# Patient Record
Sex: FEMALE | URBAN - METROPOLITAN AREA
[De-identification: names, ages, dates, MRNs, and addresses within clinical notes are randomized per-mention and may not be internally consistent; named-entity substitution may affect disease eponyms.]

---

## 2023-06-26 ENCOUNTER — APPOINTMENT (OUTPATIENT)
Dept: LAB | Facility: LAB | Age: 18
End: 2023-06-26
Payer: COMMERCIAL

## 2023-06-29 LAB
NIL(NEG) CONTROL SPOT COUNT: NORMAL
PANEL A SPOT COUNT: 0
PANEL B SPOT COUNT: 0
POS CONTROL SPOT COUNT: NORMAL
T-SPOT. TB INTERPRETATION: NEGATIVE

## 2023-12-11 PROCEDURE — RXMED WILLOW AMBULATORY MEDICATION CHARGE

## 2023-12-13 ENCOUNTER — PHARMACY VISIT (OUTPATIENT)
Dept: PHARMACY | Facility: CLINIC | Age: 18
End: 2023-12-13
Payer: COMMERCIAL

## 2024-02-14 PROCEDURE — RXMED WILLOW AMBULATORY MEDICATION CHARGE

## 2024-02-15 ENCOUNTER — PHARMACY VISIT (OUTPATIENT)
Dept: PHARMACY | Facility: CLINIC | Age: 19
End: 2024-02-15
Payer: MEDICARE

## 2024-03-23 PROCEDURE — RXMED WILLOW AMBULATORY MEDICATION CHARGE

## 2024-03-25 ENCOUNTER — PHARMACY VISIT (OUTPATIENT)
Dept: PHARMACY | Facility: CLINIC | Age: 19
End: 2024-03-25
Payer: COMMERCIAL

## 2024-04-26 PROCEDURE — RXMED WILLOW AMBULATORY MEDICATION CHARGE

## 2024-04-29 ENCOUNTER — PHARMACY VISIT (OUTPATIENT)
Dept: PHARMACY | Facility: CLINIC | Age: 19
End: 2024-04-29
Payer: COMMERCIAL

## 2024-05-06 PROCEDURE — RXMED WILLOW AMBULATORY MEDICATION CHARGE

## 2024-05-08 ENCOUNTER — PHARMACY VISIT (OUTPATIENT)
Dept: PHARMACY | Facility: CLINIC | Age: 19
End: 2024-05-08
Payer: COMMERCIAL

## 2024-09-10 PROCEDURE — RXMED WILLOW AMBULATORY MEDICATION CHARGE

## 2024-09-13 ENCOUNTER — PHARMACY VISIT (OUTPATIENT)
Dept: PHARMACY | Facility: CLINIC | Age: 19
End: 2024-09-13
Payer: COMMERCIAL

## 2025-04-21 ENCOUNTER — PHARMACY VISIT (OUTPATIENT)
Dept: PHARMACY | Facility: CLINIC | Age: 20
End: 2025-04-21
Payer: COMMERCIAL

## 2025-04-21 ENCOUNTER — HOSPITAL ENCOUNTER (OUTPATIENT)
Dept: RADIOLOGY | Facility: HOSPITAL | Age: 20
Discharge: HOME | End: 2025-04-21
Payer: COMMERCIAL

## 2025-04-21 DIAGNOSIS — G89.29 CHRONIC PAIN OF LEFT WRIST: ICD-10-CM

## 2025-04-21 DIAGNOSIS — G56.02 CARPAL TUNNEL SYNDROME OF LEFT WRIST: ICD-10-CM

## 2025-04-21 DIAGNOSIS — M25.532 CHRONIC PAIN OF LEFT WRIST: Primary | ICD-10-CM

## 2025-04-21 DIAGNOSIS — M25.532 CHRONIC PAIN OF LEFT WRIST: ICD-10-CM

## 2025-04-21 DIAGNOSIS — G89.29 CHRONIC PAIN OF LEFT WRIST: Primary | ICD-10-CM

## 2025-04-21 PROCEDURE — 73110 X-RAY EXAM OF WRIST: CPT | Mod: LT

## 2025-04-21 PROCEDURE — RXMED WILLOW AMBULATORY MEDICATION CHARGE

## 2025-04-21 PROCEDURE — 73110 X-RAY EXAM OF WRIST: CPT | Mod: LEFT SIDE | Performed by: RADIOLOGY

## 2025-04-21 RX ORDER — METHYLPREDNISOLONE 4 MG/1
TABLET ORAL
Qty: 21 TABLET | Refills: 0 | OUTPATIENT
Start: 2025-04-21

## 2025-05-01 ENCOUNTER — EVALUATION (OUTPATIENT)
Dept: OCCUPATIONAL THERAPY | Facility: HOSPITAL | Age: 20
End: 2025-05-01
Payer: COMMERCIAL

## 2025-05-01 DIAGNOSIS — G56.02 CARPAL TUNNEL SYNDROME, LEFT UPPER LIMB: Primary | ICD-10-CM

## 2025-05-01 PROCEDURE — 97165 OT EVAL LOW COMPLEX 30 MIN: CPT | Mod: GO

## 2025-05-01 PROCEDURE — 97110 THERAPEUTIC EXERCISES: CPT | Mod: GO

## 2025-05-01 ASSESSMENT — ENCOUNTER SYMPTOMS
DEPRESSION: 0
LOSS OF SENSATION IN FEET: 0
OCCASIONAL FEELINGS OF UNSTEADINESS: 0

## 2025-05-01 NOTE — PROGRESS NOTES
Occupational Therapy Evaluation    Patient Name: Cynthia Walker  MRN: [id number]  Today's Date: 5/1/2025  Time Calculation  Start Time: 0120  Stop Time: 0220  Time Calculation (min): 60 min  Problem List Items Addressed This Visit    None  Visit Diagnoses         Codes      Carpal tunnel syndrome, left upper limb    -  Primary G56.02    Relevant Orders    Follow Up In Occupational Therapy             Insurance  Visit 1 of 60  Authorization: not required  Insurance plan: Payor: [employer] / Plan: [employer]  HEALTHCARE / Product Type: *No Product type* /     Assessment: Cynthia Walker is a 21 yo [employer] senior (who is about to graduate!) with a 3 month history of left carpal tunnel syndrome. She reports intermittent moderate pain and mild tingling in the median nerve distribution that are both lessened by full time prefab wrist/hand orthosis use. Functionally, her symptoms make some activities of daily living difficult, including writing, drawing, and crafting. She demonstrates full left upper extremity motion and good strength. In addition to median nerve compression at the wrist, she reported mild tingling in her left index and middle finger tips after 30 seconds of cervical lateral flexion to the right. I got her started on a home program including brachial plexus glides, cervical stretches, active scapular motion, extrinsic wrist/finger flexor/extensor stretches, and finger tendon glides. She will benefit from ongoing occupational therapy in order to get back to safe I/ADL performance and her prior level of function.     Plan: assess lacertus fibrosis, ultrasound; therapeutic exercise, therapeutic activity, manual therapy, neuromuscular coordination, kinesiotaping  Goals  In 8-10 weeks, Cynthia will achieve the following goals:  She will be able to perform self-care, household, school, work, and leisure tasks with no or low pain (1-3/10), 75% of the time.  She will report a 50-75% decrease in incidence of paresthesia in  her L hand, allowing her to use it more functionally.  She will report that she needs her prefab WHO only sporadically (0-2 nights/week).  She will decrease her QuickDASH score by 10-15 points (20 at eval).  Patient's goals for therapy: not to have to need the WHO, resolve symptoms    Plan of care was developed with input and agreement by the patient  Frequency: Every Other Week  Duration: 8 Weeks    Precautions:  Movement Restrictions: No:  Weight Bearing Status: As tolerated    Subjective   Onset in Feb. Had been crafting a lot then. Wore brace 24/7, then weaned during the day, was still wearing it at night. Got bad again recently and is wearing it 24/7 again. Helps. She does a lot of fine motor tasks, sewing, drawing, making friendship bracelets, etc. Jolts of pain starting in CTS, sometimes traveling to fingers, they pass quickly, but sometimes it's like a sharp spasm. Has experienced muscle spasms before but not this past week. Has to take notes on ipad with pencil, which is easier than paper.    Prior level of function   Full functional use of her LUE    Patient Intake and Medical History form reviewed    Pain  At worst 7.5-8/10, can be none; hasn't woken up due to pain even with the pain    Objective   Outcome Measure: QuickDASH: 20    Edema: none now, L IF, MF have been swollen and red    Sensation  None now    Neuro exam:  Radial nerve: 5/5 strength in thumb extension, sensation intact to dorsal surface of thumb/index web space  Median nerve: 5/5 strength in thumb abduction and opposition, sensation intact to palmar surface of index finger  Ulnar nerve: 5/5 strength in thumb/little finger approximation, sensation intact to palmar surface of little finger     AROM BUE WNL      Strength  SHOULDER (MMT)                             OUT OF 5:   R L   Flexion 4+ 4+   Extension     Abduction 4+ 4+   IR at 0 degrees abd 4 4   ER at 0 degrees abd 4 4      ELBOW (MMT)   R L   Extension 4- 4-   Flexion 5 5      WRIST  (MMT)   R L   Extension 5 5   Flexion 4 4   Pronation 4 4   Supination 4 4      HAND (lbs)   R L (dominant)   Dynamometer  50 45   Lateral Pinch switch 15.5 15.5   3jaw Pinch 15 15        Special Tests LUE  Cervical screen: (each position held 30 seconds) cervical rotation to the left: NEG, cervical rotation to the right: NEG, lateral cervical flexion to left: NEG, lateral cervical flex to right: POS (mild tingling in L IF, MF tips)2, cervical extension: NEG, cervical flexion: NEG    AIN Weakness:  NEG    Pressure on lacertus fibrosis causes symptoms:    Tinel's Sign at Wrist:       Treatment  Education: home exercise program, plan of care, activity modification, pain management, and pertinent anatomy     Orthosis: She will continue to wear her prefab WHO full time until she graduates 5/18/25. During the following 2 weeks at home in [place], she will wean from it.  Therapeutic Exercise: Instructed her in her home program: brachial plexus glides, cervical stretches, active scapular motion, extrinsic wrist/finger flexor/extensor stretches, and finger tendon glides, all x 2 sets of 10 2x/day.        OT Evaluation Time Entry  OT Evaluation (Low) Time Entry: 40  OT Therapeutic Procedures Time Entry  Manual Therapy Time Entry: 10  Therapeutic Exercise Time Entry: 10

## 2025-05-13 ENCOUNTER — TREATMENT (OUTPATIENT)
Dept: OCCUPATIONAL THERAPY | Facility: HOSPITAL | Age: 20
End: 2025-05-13
Payer: COMMERCIAL

## 2025-05-13 DIAGNOSIS — G56.02 CARPAL TUNNEL SYNDROME, LEFT UPPER LIMB: ICD-10-CM

## 2025-05-13 PROCEDURE — 97140 MANUAL THERAPY 1/> REGIONS: CPT | Mod: GO

## 2025-05-13 PROCEDURE — 97110 THERAPEUTIC EXERCISES: CPT | Mod: GO

## 2025-05-13 NOTE — PROGRESS NOTES
Occupational Therapy Treatment    Patient Name: Cynthia Walker  MRN: [id number]  Today's Date: 5/13/2025  Time Calculation  Start Time: 0915  Stop Time: 0950  Time Calculation (min): 35 min  Problem List Items Addressed This Visit    None  Visit Diagnoses         Codes      Carpal tunnel syndrome, left upper limb     G56.02             Insurance  Visit 2  of 60  Authorization: not required  Insurance plan: Payor: [employer] / Plan: [employer]  HEALTHCARE / Product Type: *No Product type* /     Assessment: Cynthia's symptoms have diminished. She will continue with her home program and contact me if her symptoms do not totally resolve.     Plan: follow up prn  Goals  In 8-10 weeks, Cynthia will achieve the following goals:  She will be able to perform self-care, household, school, work, and leisure tasks with no or low pain (1-3/10), 75% of the time.  She will report a 50-75% decrease in incidence of paresthesia in her L hand, allowing her to use it more functionally.  She will report that she needs her prefab WHO only sporadically (0-2 nights/week).  She will decrease her QuickDASH score by 10-15 points (20 at eval).  Patient's goals for therapy: not to have to need the WHO, resolve symptoms    Plan of care was developed with input and agreement by the patient  Frequency: Every Other Week  Duration: 8 Weeks    Precautions:  Movement Restrictions: No:  Weight Bearing Status: As tolerated    Subjective   Done with school/Case, graduation this weekend; symptoms have improved. A little tingling in her hand since first visit, not a lot. Doing HEP 1-2x/day but has not been doing the scapular or cervical AROM; wearing WHO at night still.    Prior level of function   Full functional use of her LUE    Patient Intake and Medical History form reviewed    Pain  No pain currently. At worst 7.5-8/10, can be none; hasn't woken up due to pain even with the pain    Objective   Outcome Measure: QuickDASH: 20    Edema: none now, L IF, MF have  been swollen and red    Sensation  No paresthesia now    Neuro exam:  WNL  Radial nerve: 5/5 strength in thumb extension, sensation intact to dorsal surface of thumb/index web space  Median nerve: 5/5 strength in thumb abduction and opposition, sensation intact to palmar surface of index finger  Ulnar nerve: 5/5 strength in thumb/little finger approximation, sensation intact to palmar surface of little finger     AROM BUE WNL    Strength  SHOULDER (MMT)                             OUT OF 5:   R L   Flexion 4+ 4+   Extension     Abduction 4+ 4+   IR at 0 degrees abd 4 4   ER at 0 degrees abd 4 4      ELBOW (MMT)   R L   Extension 4- 4-   Flexion 5 5      WRIST (MMT)   R L   Extension 5 5   Flexion 4 4   Pronation 4 4   Supination 4 4      HAND (lbs)   R L (dominant)   Dynamometer  50 45   Lateral Pinch switch 15.5 15.5   3jaw Pinch 15 15        Special Tests LUE  Cervical screen: (each position held 30 seconds) cervical rotation to the left: NEG, cervical rotation to the right: NEG, lateral cervical flexion to left: NEG, lateral cervical flex to right: POS (mild tingling in L IF, MF tips)2, cervical extension: NEG, cervical flexion: NEG    AIN Weakness:  NEG    Tinel's Sign at Wrist: POS, Durkan's POS      Treatment  Education: home exercise program, plan of care, activity modification, pain management, and pertinent anatomy     Orthosis: She will continue to wear her prefab WHO full time until she graduates 5/18/25. During the following 2 weeks at home in [place], she will wean from it.  Manual: Performed CTS tests: Tinel's and Durkan's at wrist, both positive. Trigger point releases about L hand/wrist/forearm, intrinsic hand muscle stretching for pain relief. Instructed her in doing the same herself, and in trigger point releases among neck muscles, which I demonstrated on myself and had her try on herself. Recommended moist heat for pain about her hand/wrist/neck.  Therapeutic Exercise: Reassessed symptoms,  functional status, HEP compliance. Reviewed her home program, which she performed correctly.    HEP: brachial plexus glides, cervical stretches, active scapular motion, extrinsic wrist/finger flexor/extensor stretches, and finger tendon glides, all x 2 sets of 10 2x/day.       OT Therapeutic Procedures Time Entry  Manual Therapy Time Entry: 15  Therapeutic Exercise Time Entry: 20